# Patient Record
Sex: MALE | Race: WHITE | Employment: UNEMPLOYED | URBAN - METROPOLITAN AREA
[De-identification: names, ages, dates, MRNs, and addresses within clinical notes are randomized per-mention and may not be internally consistent; named-entity substitution may affect disease eponyms.]

---

## 2018-05-22 ENCOUNTER — HOSPICE ADMISSION (OUTPATIENT)
Dept: HOSPICE | Facility: HOSPICE | Age: 58
End: 2018-05-22
Payer: MEDICARE

## 2018-05-24 ENCOUNTER — HOME CARE VISIT (OUTPATIENT)
Dept: SCHEDULING | Facility: HOME HEALTH | Age: 58
End: 2018-05-24
Payer: MEDICARE

## 2018-05-24 PROCEDURE — 0651 HSPC ROUTINE HOME CARE

## 2018-05-24 PROCEDURE — G0299 HHS/HOSPICE OF RN EA 15 MIN: HCPCS

## 2018-05-24 PROCEDURE — HOSPICE MEDICATION HC HH HOSPICE MEDICATION

## 2018-05-24 PROCEDURE — 3336500001 HSPC ELECTION

## 2018-05-25 ENCOUNTER — HOME CARE VISIT (OUTPATIENT)
Dept: SCHEDULING | Facility: HOME HEALTH | Age: 58
End: 2018-05-25
Payer: MEDICARE

## 2018-05-25 VITALS
HEART RATE: 84 BPM | HEIGHT: 72 IN | WEIGHT: 160 LBS | BODY MASS INDEX: 21.67 KG/M2 | DIASTOLIC BLOOD PRESSURE: 64 MMHG | SYSTOLIC BLOOD PRESSURE: 90 MMHG | OXYGEN SATURATION: 97 % | RESPIRATION RATE: 20 BRPM

## 2018-05-25 PROCEDURE — G0299 HHS/HOSPICE OF RN EA 15 MIN: HCPCS

## 2018-05-25 PROCEDURE — G0155 HHCP-SVS OF CSW,EA 15 MIN: HCPCS

## 2018-05-25 PROCEDURE — 0651 HSPC ROUTINE HOME CARE

## 2018-05-26 ENCOUNTER — HOME CARE VISIT (OUTPATIENT)
Dept: HOSPICE | Facility: HOSPICE | Age: 58
End: 2018-05-26
Payer: MEDICARE

## 2018-05-26 PROCEDURE — 0651 HSPC ROUTINE HOME CARE

## 2018-05-27 PROCEDURE — 0651 HSPC ROUTINE HOME CARE

## 2018-05-28 ENCOUNTER — HOME CARE VISIT (OUTPATIENT)
Dept: SCHEDULING | Facility: HOME HEALTH | Age: 58
End: 2018-05-28
Payer: MEDICARE

## 2018-05-28 VITALS — HEART RATE: 96 BPM | DIASTOLIC BLOOD PRESSURE: 60 MMHG | RESPIRATION RATE: 24 BRPM | SYSTOLIC BLOOD PRESSURE: 96 MMHG

## 2018-05-28 PROCEDURE — 0651 HSPC ROUTINE HOME CARE

## 2018-05-29 ENCOUNTER — HOME CARE VISIT (OUTPATIENT)
Dept: SCHEDULING | Facility: HOME HEALTH | Age: 58
End: 2018-05-29
Payer: MEDICARE

## 2018-05-29 PROCEDURE — 99343 PR HOME VST NEW PATIENT MOD-HI SEVERITY 45 MINUTES: CPT | Performed by: INTERNAL MEDICINE

## 2018-05-29 PROCEDURE — 0651 HSPC ROUTINE HOME CARE

## 2018-05-29 PROCEDURE — G0299 HHS/HOSPICE OF RN EA 15 MIN: HCPCS

## 2018-05-30 ENCOUNTER — DOCUMENTATION ONLY (OUTPATIENT)
Dept: OTHER | Age: 58
End: 2018-05-30

## 2018-05-30 VITALS — SYSTOLIC BLOOD PRESSURE: 94 MMHG | DIASTOLIC BLOOD PRESSURE: 56 MMHG

## 2018-05-30 PROBLEM — F10.10 ETOH ABUSE: Status: ACTIVE | Noted: 2018-05-30

## 2018-05-30 PROBLEM — C78.7 METASTATIC CANCER TO LIVER (HCC): Status: ACTIVE | Noted: 2018-05-30

## 2018-05-30 PROBLEM — B19.20 HEPATITIS C: Status: ACTIVE | Noted: 2018-05-30

## 2018-05-30 PROCEDURE — 0651 HSPC ROUTINE HOME CARE

## 2018-05-31 ENCOUNTER — HOME CARE VISIT (OUTPATIENT)
Dept: SCHEDULING | Facility: HOME HEALTH | Age: 58
End: 2018-05-31
Payer: MEDICARE

## 2018-05-31 PROCEDURE — 0651 HSPC ROUTINE HOME CARE

## 2018-06-01 PROCEDURE — 0651 HSPC ROUTINE HOME CARE

## 2018-06-02 PROCEDURE — 0651 HSPC ROUTINE HOME CARE

## 2018-06-03 PROCEDURE — 0651 HSPC ROUTINE HOME CARE

## 2018-06-04 ENCOUNTER — HOSPITAL ENCOUNTER (OUTPATIENT)
Dept: ULTRASOUND IMAGING | Age: 58
Discharge: HOME | End: 2018-06-04
Attending: INTERNAL MEDICINE
Payer: OTHER MISCELLANEOUS

## 2018-06-04 VITALS — SYSTOLIC BLOOD PRESSURE: 92 MMHG | OXYGEN SATURATION: 93 % | DIASTOLIC BLOOD PRESSURE: 53 MMHG

## 2018-06-04 DIAGNOSIS — C22.0 HEPATOCELLULAR CARCINOMA (HCC): ICD-10-CM

## 2018-06-04 PROCEDURE — 74011000250 HC RX REV CODE- 250: Performed by: PHYSICIAN ASSISTANT

## 2018-06-04 PROCEDURE — 0651 HSPC ROUTINE HOME CARE

## 2018-06-04 PROCEDURE — C1729 CATH, DRAINAGE: HCPCS

## 2018-06-04 RX ORDER — LIDOCAINE HYDROCHLORIDE 10 MG/ML
10-200 INJECTION INFILTRATION; PERINEURAL ONCE
Status: COMPLETED | OUTPATIENT
Start: 2018-06-04 | End: 2018-06-04

## 2018-06-04 RX ADMIN — LIDOCAINE HYDROCHLORIDE 4 ML: 10 INJECTION, SOLUTION INFILTRATION; PERINEURAL at 13:32

## 2018-06-04 RX ADMIN — SODIUM BICARBONATE 2 ML: 0.2 INJECTION, SOLUTION INTRAVENOUS at 13:32

## 2018-06-04 NOTE — DISCHARGE INSTRUCTIONS
[address]  Department of Interventional Radiology  [facility]  [phone] Office  [phone] Fax    PARACENTESIS DISCHARGE INSTRUCTIONS    General Information:  During this procedure, the doctor will insert a needle into the abdomen to drain fluid. After the procedure, you will be able to take a deep breath much easier. The site of the puncture may ooze the first day. This will decrease and eventually stop. Paracentesis (draining fluid from the abdomen) sometimes makes patients hypotensive (low blood pressure). Your doctor may order for you to receive fluids or albumin (a volume booster) during the procedure through an IV site. Home Care Instructions:  Keep the puncture site clean and dry. No tub baths or swimming until puncture site heals. Showering is acceptable. Resume your normal diet, and resume your normal activity slowly and as you tolerate. If you are short of breath, rest. If shortness of breath does not ease, please call your ordering doctor. Fluid can re-accumulate in the chest and/or in the abdomen. If this should occur, your doctor needs to know as you may need to have the procedure done again. Call If:     You should call your Physician and/or the Radiology Nurse if you notice any signs of infection, like pus draining, or if it is swollen or reddened. Also call if you have a fever, or if you are bleeding from the puncture site more than a small amount on the dressing. Call if the puncture site keeps draining fluid. Some oozing is to be expected, but should slow and then stop. Call if you feel like you have pressure in your abdomen. SEEK IMMEDIATE CARE OR CALL 911 IF YOU SUDDENLY HAVE TROUBLE BREATHING, OR IF YOUR LIPS TURN BLUE, OR IF YOU NOTICE BLOOD IN YOUR SPUTUM. Follow-Up Instructions: Please see your ordering doctor as he/she has requested. To Reach Us:     If you have any questions about your procedure, please call the Interventional Radiology department at [phone]. After business hours (5pm) and weekends, call the answering service at [phone] and ask for the Radiologist on call to be paged. Interventional Radiology General Nurse Discharge    * Please give a list of your current medications to your Primary Care Provider. * Please update this list whenever your medications are discontinued, doses are     changed, or new medications (including over-the-counter products) are added. * Please carry medication information at all times in case of emergency situations. These are general instructions for a healthy lifestyle:    No smoking/ No tobacco products/ Avoid exposure to second hand smoke  Surgeon General's Warning:  Quitting smoking now greatly reduces serious risk to your health. Obesity, smoking, and sedentary lifestyle greatly increases your risk for illness  A healthy diet, regular physical exercise & weight monitoring are important for maintaining a healthy lifestyle    You may be retaining fluid if you have a history of heart failure or if you experience any of the following symptoms:  Weight gain of 3 pounds or more overnight or 5 pounds in a week, increased swelling in our hands or feet or shortness of breath while lying flat in bed. Please call your doctor as soon as you notice any of these symptoms; do not wait until your next office visit. Recognize signs and symptoms of STROKE:  F-face looks uneven    A-arms unable to move or move unevenly    S-speech slurred or non-existent    T-time-call 911 as soon as signs and symptoms begin-DO NOT go       Back to bed or wait to see if you get better-TIME IS BRAIN.             Date: 6/4/2018  Discharging Nurse: Alonzo Jose

## 2018-06-04 NOTE — PROGRESS NOTES
Interventional Radiology Post Paracentesis/Thoracentesis Note    6/4/2018    Procedure(s): Ultrasound guided Therapeutic Paracentesis Performed with 8 [place] catheter total volume 4,000 ml. Preliminary Findings: moderate cloudy and tan. Complications: None    Pt requesting sutures for closure rather than dermabond glue. Plan:  Observation, check labs if drawn.           Chest X-Ray:  no    Full dictated report to follow    Signed By: Vince Guaman

## 2018-06-04 NOTE — IP AVS SNAPSHOT
Summary of Care Report The Summary of Care report has been created to help improve care coordination. Users with access to [employer] or [employer] [facility] (Web-based application) may access additional patient information including the Discharge Summary. If you are not currently a [address] user and need more information, please call the number listed below in the [place] [address] section and ask to be connected with Medical Records. Facility Information Name Address Phone [phone] [address] [address] [phone] Patient Information Patient Name Sex  Brian Dials ([id number]) Male 1960 Discharge Information Admitting Provider Service Area Unit  
 (none) [facility] Radiology  / [phone] Discharge Provider Discharge Date/Time Discharge Disposition Destination (none) (none) (none) (none) Patient Language Language ENGLISH [13] Hospital Problems as of 2018  Reviewed: 2018  9:49 AM by Pacheco Callahan NP None Non-Hospital Problems as of 2018  Reviewed: 2018  9:49 AM by Pacheco Callahan NP Class Noted - Resolved Last Modified Active Problems Osteoarthritis of right knee  2010 - Present 2010 Entered by Zoila Mcbride MD  
  Total knee replacement status  2010 - Present 2010 Entered by Zoila Mcbride MD  
  Localized osteoarthrosis not specified whether primary or secondary, pelvic region and thigh  9/15/2015 - Present 9/15/2015 by Magalys Barr Entered by Magalys Barr S/P total hip arthroplasty  9/15/2015 - Present 9/15/2015 by Rolly Kelley MD  
  Entered by Rolly Kelley MD  
  Metastatic cancer to liver [facility])  2018 - Present 2018 by Pacheco Callahan NP   Entered by Pacheco Callahan NP  
 Hepatitis C  5/30/2018 - Present 5/30/2018 by Tony Garcia NP Entered by Tony Garcia NP  
  ETOH abuse  5/30/2018 - Present 5/30/2018 by Tony Garcia NP Entered by Tony Garcia NP You are allergic to the following Allergen Reactions Oxycontin (Oxycodone) Nausea and Vomiting Current Discharge Medication List  
  
ASK your doctor about these medications Dose & Instructions Dispensing Information Comments  
 furosemide 80 mg tablet Commonly known as:  LASIX Dose:  160 mg Take 160 mg by mouth daily. Refills:  0 HYDROmorphone 2 mg tablet Commonly known as:  DILAUDID Dose:  2 mg Take 2 mg by mouth every six (6) hours as needed for Pain. Refills:  0  
   
 lactulose 10 gram/15 mL solution Commonly known as:  [place] Dose:  10 g Take 10 g by mouth daily as needed (for constipation). Refills:  0  
   
 ondansetron hcl 8 mg tablet Commonly known as:  Norina [place] Dose:  8 mg Take 8 mg by mouth every eight (8) hours as needed for Nausea. Refills:  0  
   
 spironolactone 100 mg tablet Commonly known as:  ALDACTONE Dose:  400 mg Take 400 mg by mouth daily. takes in the afternoon Refills:  0  
   
 traZODone 100 mg tablet Commonly known as:  Dina Jes Dose:  100 mg Take 100 mg by mouth nightly. for sleep Refills:  0 Current Immunizations Name Date  
 TB Skin Test (PPD) Intradermal 9/15/2015 Surgery Information ID Date/Time Status Primary Surgeon All Procedures Location [id number] 6/4/2018 Unposted   [facility] ANGIO IR OR-DO NOT SCHEDULE Follow-up Information None Discharge Instructions [place] [address] Department of Interventional Radiology [facility] 
[phone] Office 
[phone] Fax PARACENTESIS DISCHARGE INSTRUCTIONS General Information: 
During this procedure, the doctor will insert a needle into the abdomen to drain fluid. After the procedure, you will be able to take a deep breath much easier. The site of the puncture may ooze the first day. This will decrease and eventually stop. Paracentesis (draining fluid from the abdomen) sometimes makes patients hypotensive (low blood pressure). Your doctor may order for you to receive fluids or albumin (a volume booster) during the procedure through an IV site. Home Care Instructions: 
Keep the puncture site clean and dry. No tub baths or swimming until puncture site heals. Showering is acceptable. Resume your normal diet, and resume your normal activity slowly and as you tolerate. If you are short of breath, rest. If shortness of breath does not ease, please call your ordering doctor. Fluid can re-accumulate in the chest and/or in the abdomen. If this should occur, your doctor needs to know as you may need to have the procedure done again. Call If: 
   You should call your Physician and/or the Radiology Nurse if you notice any signs of infection, like pus draining, or if it is swollen or reddened. Also call if you have a fever, or if you are bleeding from the puncture site more than a small amount on the dressing. Call if the puncture site keeps draining fluid. Some oozing is to be expected, but should slow and then stop. Call if you feel like you have pressure in your abdomen. SEEK IMMEDIATE CARE OR CALL 911 IF YOU SUDDENLY HAVE TROUBLE BREATHING, OR IF YOUR LIPS TURN BLUE, OR IF YOU NOTICE BLOOD IN YOUR SPUTUM. Follow-Up Instructions: Please see your ordering doctor as he/she has requested. To Reach Us: If you have any questions about your procedure, please call the Interventional Radiology department at [phone]. After business hours (5pm) and weekends, call the answering service at [phone] and ask for the Radiologist on call to be paged. Interventional Radiology General Nurse Discharge * Please give a list of your current medications to your Primary Care Provider. * Please update this list whenever your medications are discontinued, doses are 
   changed, or new medications (including over-the-counter products) are added. * Please carry medication information at all times in case of emergency situations. These are general instructions for a healthy lifestyle: No smoking/ No tobacco products/ Avoid exposure to second hand smoke Surgeon General's Warning:  Quitting smoking now greatly reduces serious risk to your health. Obesity, smoking, and sedentary lifestyle greatly increases your risk for illness A healthy diet, regular physical exercise & weight monitoring are important for maintaining a healthy lifestyle You may be retaining fluid if you have a history of heart failure or if you experience any of the following symptoms:  Weight gain of 3 pounds or more overnight or 5 pounds in a week, increased swelling in our hands or feet or shortness of breath while lying flat in bed. Please call your doctor as soon as you notice any of these symptoms; do not wait until your next office visit. Recognize signs and symptoms of STROKE: 
F-face looks uneven A-arms unable to move or move unevenly S-speech slurred or non-existent T-time-call 911 as soon as signs and symptoms begin-DO NOT go Back to bed or wait to see if you get better-TIME IS BRAIN. Date: 6/4/2018 Discharging Nurse: Regis Vaughn Chart Review Routing History No Routing History on File

## 2018-06-04 NOTE — IP AVS SNAPSHOT
[address] 
[phone] Patient: Burton Draper MRN: [id number] EAW:18/01/0150 About your hospitalization You were admitted on:  June 4, 2018 You last received care in the:  [facility] You were discharged on:  June 4, 2018 Why you were hospitalized Your primary diagnosis was:  Not on File Follow-up Information None Your Scheduled Appointments Tuesday June 05, 2018 To Be Determined SN HOSPICE VISIT with Savannah Van RN  
[facility] ([facility] ([facility]) Friday June 08, 2018 To Be Determined MSW HOSPICE VISIT with Buckner Olszewski, LMSW [facility]) Tuesday June 12, 2018 To Be Determined SN HOSPICE VISIT with LEONARDO Betts [facility] Tuesday June 19, 2018 To Be Determined SN HOSPICE VISIT with LEONARDO Betts [facility] Tuesday June 26, 2018 To Be Determined SN HOSPICE VISIT with LEONARDO Betts [facility] ([facility]) Monday July 02, 2018 To Be Determined  with Vinnie Bermudez [facility] ([facility])  Tuesday July 03, 2018 To Be Determined SN HOSPICE VISIT with LEONARDO Knight [facility] (1 [facility]) Tuesday July 10, 2018 To Be Determined SN HOSPICE VISIT with LEONARDO Knight [facility] ([facility]) Thursday July 12, 2018 To Be Determined MSW HOSPICE VISIT with Myra Oliveira LMSW [facility] Tuesday July 17, 2018 To Be Determined SN HOSPICE VISIT with LEONARDO Knight [facility] ([facility]) Tuesday July 24, 2018 To Be Determined SN HOSPICE VISIT with LEONARDO Knight [facility] Monday July 30, 2018 [facility] Discharge Orders None A check gisela indicates which time of day the medication should be taken. My Medications ASK your doctor about these medications Instructions Each Dose to Equal  
 Morning Noon Evening Bedtime  
 furosemide 80 mg tablet Commonly known as:  LASIX Your last dose was: Your next dose is: Take 160 mg by mouth daily. 160 mg HYDROmorphone 2 mg tablet Commonly known as:  DILAUDID Your last dose was:     
   
Your next dose is: Take 2 mg by mouth every six (6) hours as needed for Pain. 2 mg  
    
   
   
   
  
 lactulose 10 gram/15 mL solution Commonly known as:  Maryla Mood Your last dose was: Your next dose is: Take 10 g by mouth daily as needed (for constipation). 10 g  
    
   
   
   
  
 ondansetron hcl 8 mg tablet Commonly known as:  Yehuda Mighty Your last dose was: Your next dose is: Take 8 mg by mouth every eight (8) hours as needed for Nausea. 8 mg  
    
   
   
   
  
 spironolactone 100 mg tablet Commonly known as:  ALDACTONE Your last dose was: Your next dose is: Take 400 mg by mouth daily. takes in the afternoon 400 mg  
    
   
   
   
  
 traZODone 100 mg tablet Commonly known as:  Eddi Ort Your last dose was: Your next dose is: Take 100 mg by mouth nightly. for sleep 100 mg Opioid Education Prescription Opioids: What You Need to Know: 
 
Prescription opioids can be used to help relieve moderate-to-severe pain and are often prescribed following a surgery or injury, or for certain health conditions. These medications can be an important part of treatment but also come with serious risks. Opioids are strong pain medicines. Examples include hydrocodone, oxycodone, fentanyl, and morphine. Heroin is an example of an illegal opioid. It is important to work with your health care provider to make sure you are getting the safest, most effective care. WHAT ARE THE RISKS AND SIDE EFFECTS OF OPIOID USE? Prescription opioids carry serious risks of addiction and overdose, especially with prolonged use. An opioid overdose, often marked by slow breathing, can cause sudden death. The use of prescription opioids can have a number of side effects as well, even when taken as directed.  
  
· Tolerance-meaning you might need to take more of a medication for the same pain relief · Physical dependence-meaning you have symptoms of withdrawal when the medication is stopped. Withdrawal symptoms can include nausea, sweating, chills, diarrhea, stomach cramps, and muscle aches. Withdrawal can last up to several weeks, depending on which drug you took and how long you took it. · Increased sensitivity to pain · Constipation · Nausea, vomiting, and dry mouth · Sleepiness and dizziness · Confusion · Depression · Low levels of testosterone that can result in lower sex drive, energy, and strength · Itching and sweating RISKS ARE GREATER WITH:      
· History of drug misuse, substance use disorder, or overdose · Mental health conditions (such as depression or anxiety) · Sleep apnea · Older age (72 years or older) · Pregnancy Avoid alcohol while taking prescription opioids. Also, unless specifically advised by your health care provider, medications to avoid include: · Benzodiazepines (such as Xanax or Valium) · Muscle relaxants (such as Soma or Flexeril) · Hypnotics (such as Ambien or Lunesta) · Other prescription opioids KNOW YOUR OPTIONS Talk to your health care provider about ways to manage your pain that don't involve prescription opioids. Some of these options may actually work better and have fewer risks and side effects. Options may include: 
· Pain relievers such as acetaminophen, ibuprofen, and naproxen · Some medications that are also used for depression or seizures · Physical therapy and exercise · Counseling to help patients learn how to cope better with triggers of pain and stress. · Application of heat or cold compress · Massage therapy · Relaxation techniques Be Informed Make sure you know the name of your medication, how much and how often to take it, and its potential risks & side effects. IF YOU ARE PRESCRIBED OPIOIDS FOR PAIN: 
· Never take opioids in greater amounts or more often than prescribed.   Remember the goal is not to be pain-free but to manage your pain at a tolerable level. · Follow up with your primary care provider to: · Work together to create a plan on how to manage your pain. · Talk about ways to help manage your pain that don't involve prescription opioids. · Talk about any and all concerns and side effects. · Help prevent misuse and abuse. · Never sell or share prescription opioids · Help prevent misuse and abuse. · Store prescription opioids in a secure place and out of reach of others (this may include visitors, children, friends, and family). · Safely dispose of unused/unwanted prescription opioids: Find your community drug take-back program or your pharmacy mail-back program, or flush them down the toilet, following guidance from the Food and Drug Administration (www.fda.gov/Drugs/ResourcesForYou). · Visit www.cdc.gov/drugoverdose to learn about the risks of opioid abuse and overdose. · If you believe you may be struggling with addiction, tell your health care provider and ask for guidance or call [address] [place] [address] at [phone]. Discharge Instructions [address] Department of Interventional Radiology [facility] 
[phone] Office 
[phone] Fax PARACENTESIS DISCHARGE INSTRUCTIONS General Information: 
During this procedure, the doctor will insert a needle into the abdomen to drain fluid. After the procedure, you will be able to take a deep breath much easier. The site of the puncture may ooze the first day. This will decrease and eventually stop. Paracentesis (draining fluid from the abdomen) sometimes makes patients hypotensive (low blood pressure). Your doctor may order for you to receive fluids or albumin (a volume booster) during the procedure through an IV site.   
 
Home Care Instructions: 
Keep the puncture site clean and dry. No tub baths or swimming until puncture site heals. Showering is acceptable. Resume your normal diet, and resume your normal activity slowly and as you tolerate. If you are short of breath, rest. If shortness of breath does not ease, please call your ordering doctor. Fluid can re-accumulate in the chest and/or in the abdomen. If this should occur, your doctor needs to know as you may need to have the procedure done again. Call If: 
   You should call your Physician and/or the Radiology Nurse if you notice any signs of infection, like pus draining, or if it is swollen or reddened. Also call if you have a fever, or if you are bleeding from the puncture site more than a small amount on the dressing. Call if the puncture site keeps draining fluid. Some oozing is to be expected, but should slow and then stop. Call if you feel like you have pressure in your abdomen. SEEK IMMEDIATE CARE OR CALL 911 IF YOU SUDDENLY HAVE TROUBLE BREATHING, OR IF YOUR LIPS TURN BLUE, OR IF YOU NOTICE BLOOD IN YOUR SPUTUM. Follow-Up Instructions: Please see your ordering doctor as he/she has requested. To Reach Us: If you have any questions about your procedure, please call the Interventional Radiology department at [phone]. After business hours (5pm) and weekends, call the answering service at [phone] and ask for the Radiologist on call to be paged. Interventional Radiology General Nurse Discharge * Please give a list of your current medications to your Primary Care Provider. * Please update this list whenever your medications are discontinued, doses are 
   changed, or new medications (including over-the-counter products) are added. * Please carry medication information at all times in case of emergency situations. These are general instructions for a healthy lifestyle: No smoking/ No tobacco products/ Avoid exposure to second hand smoke Surgeon General's Warning:  Quitting smoking now greatly reduces serious risk to your health. Obesity, smoking, and sedentary lifestyle greatly increases your risk for illness A healthy diet, regular physical exercise & weight monitoring are important for maintaining a healthy lifestyle You may be retaining fluid if you have a history of heart failure or if you experience any of the following symptoms:  Weight gain of 3 pounds or more overnight or 5 pounds in a week, increased swelling in our hands or feet or shortness of breath while lying flat in bed. Please call your doctor as soon as you notice any of these symptoms; do not wait until your next office visit. Recognize signs and symptoms of STROKE: 
F-face looks uneven A-arms unable to move or move unevenly S-speech slurred or non-existent T-time-call 911 as soon as signs and symptoms begin-DO NOT go Back to bed or wait to see if you get better-TIME IS BRAIN. Date: 6/4/2018 Discharging Nurse: Meaghan Chacon Introducing [facility]! Santos Noble introduces [employer] patient portal. Now you can access parts of your medical record, email your doctor's office, and request medication refills online. 1. In your internet browser, go to [email or web address] 2. Click on the First Time User? Click Here link in the Sign In box. You will see the New Member Sign Up page. 3. Enter your [employer] Access Code exactly as it appears below. You will not need to use this code after youve completed the sign-up process. If you do not sign up before the expiration date, you must request a new code. · [employer] Access Code: [email or web address] Expires: 8/20/2018  3:03 PM 
 
4. Enter the last four digits of your Social Security Number (xxxx) and Date of Birth (mm/dd/yyyy) as indicated and click Submit. You will be taken to the next sign-up page. 5. Create a [employer] ID.  This will be your [employer] login ID and cannot be changed, so think of one that is secure and easy to remember. 6. Create a [employer] password. You can change your password at any time. 7. Enter your Password Reset Question and Answer. This can be used at a later time if you forget your password. 8. Enter your e-mail address. You will receive e-mail notification when new information is available in [address] 9. Click Sign Up. You can now view and download portions of your medical record. 10. Click the Download Summary menu link to download a portable copy of your medical information. If you have questions, please visit the Frequently Asked Questions section of the [employer] website. Remember, [employer] is NOT to be used for urgent needs. For medical emergencies, dial 911. Now available from your iPhone and Android! Introducing Preston Nogueira As a [facility] patient, I wanted to make you aware of our electronic visit tool called Preston Nogueira. Regan QuintanillaYelll[employer] [facility] Munch [email or web address] allows you to connect within minutes with a medical provider 24 hours a day, seven days a week via a mobile device or tablet or logging into a secure website from your computer. You can access Preston Nogueira from anywhere in the [place]. A virtual visit might be right for you when you have a simple condition and feel like you just dont want to get out of bed, or cant get away from work for an appointment, when your regular [facility] provider is not available (evenings, weekends or holidays), or when youre out of town and need minor care. Electronic visits cost only $49 and if the [employer] [facility] [employer]unch [email or web address] provider determines a prescription is needed to treat your condition, one can be electronically transmitted to a nearby pharmacy*. Please take a moment to enroll today if you have not already done so. The enrollment process is free and takes just a few minutes. To enroll, please download the [employer] to your tablet or phone, or visit [email or web address] to enroll on your computer. And, as an [address] [place] [address] patient with a [employer] account, the results of your visits will be scanned into your electronic medical record and your primary care provider will be able to view the scanned results. We urge you to continue to see your regular [employer] provider for your ongoing medical care. And while your primary care provider may not be the one available when you seek a Preston Richardsfin virtual visit, the peace of mind you get from getting a real diagnosis real time can be priceless. For more information on Novel [employer]den[employer], view our Frequently Asked Questions (FAQs) at [email or web address]. Sincerely, 
 
Ashleigh Troy MD 
Chief Medical Officer [address] *:  certain medications cannot be prescribed via [employer] Unresulted Labs-Please follow up with your PCP about these lab tests Order Current Status US GUIDE PARACENTESIS In process Providers Seen During Your Hospitalization Provider Specialty Primary office phone Angela Ballesteros MD Geriatric Medicine [phone] Your Primary Care Physician (PCP) Primary Care Physician Office Phone Office Fax NONE ** None ** ** None ** You are allergic to the following Allergen Reactions Oxycontin (Oxycodone) Nausea and Vomiting Recent Documentation Smoking Status Current Every Day Smoker Emergency Contacts Name Discharge Info Relation Home Work Mobile Jen Rodriguez  Girlfriend [18] [phone] Patient Belongings The following personal items are in your possession at time of discharge: 
     Visual Aid: Glasses Please provide this summary of care documentation to your next provider. Signatures-by signing, you are acknowledging that this After Visit Summary has been reviewed with you and you have received a copy.   
  
 
  
    
    
 Patient Signature:  ____________________________________________________________ Date:  ____________________________________________________________  
  
Guinevere Coup Provider Signature:  ____________________________________________________________ Date:  ____________________________________________________________

## 2018-06-05 PROCEDURE — 0651 HSPC ROUTINE HOME CARE

## 2018-06-06 PROCEDURE — 0651 HSPC ROUTINE HOME CARE

## 2018-06-07 ENCOUNTER — HOME CARE VISIT (OUTPATIENT)
Dept: SCHEDULING | Facility: HOME HEALTH | Age: 58
End: 2018-06-07
Payer: MEDICARE

## 2018-06-07 PROCEDURE — G0299 HHS/HOSPICE OF RN EA 15 MIN: HCPCS

## 2018-06-07 PROCEDURE — 0651 HSPC ROUTINE HOME CARE

## 2018-06-07 PROCEDURE — HOSPICE MEDICATION HC HH HOSPICE MEDICATION

## 2018-06-08 VITALS
DIASTOLIC BLOOD PRESSURE: 62 MMHG | HEART RATE: 84 BPM | TEMPERATURE: 96.9 F | RESPIRATION RATE: 22 BRPM | SYSTOLIC BLOOD PRESSURE: 100 MMHG

## 2018-06-08 PROCEDURE — 0651 HSPC ROUTINE HOME CARE

## 2018-06-09 PROCEDURE — 0651 HSPC ROUTINE HOME CARE

## 2018-06-10 PROCEDURE — 0651 HSPC ROUTINE HOME CARE

## 2018-06-11 ENCOUNTER — HOSPITAL ENCOUNTER (OUTPATIENT)
Dept: ULTRASOUND IMAGING | Age: 58
Discharge: HOME | End: 2018-06-11
Attending: INTERNAL MEDICINE
Payer: OTHER MISCELLANEOUS

## 2018-06-11 VITALS
TEMPERATURE: 98 F | HEART RATE: 88 BPM | SYSTOLIC BLOOD PRESSURE: 97 MMHG | DIASTOLIC BLOOD PRESSURE: 56 MMHG | BODY MASS INDEX: 21.4 KG/M2 | OXYGEN SATURATION: 93 % | RESPIRATION RATE: 16 BRPM | HEIGHT: 72 IN | WEIGHT: 158 LBS

## 2018-06-11 DIAGNOSIS — R18.8 ASCITES: ICD-10-CM

## 2018-06-11 PROCEDURE — C1729 CATH, DRAINAGE: HCPCS

## 2018-06-11 PROCEDURE — 74011000250 HC RX REV CODE- 250: Performed by: RADIOLOGY

## 2018-06-11 PROCEDURE — 0651 HSPC ROUTINE HOME CARE

## 2018-06-11 RX ORDER — LIDOCAINE HYDROCHLORIDE 10 MG/ML
20-200 INJECTION INFILTRATION; PERINEURAL ONCE
Status: COMPLETED | OUTPATIENT
Start: 2018-06-11 | End: 2018-06-11

## 2018-06-11 RX ADMIN — LIDOCAINE HYDROCHLORIDE 6 ML: 10 INJECTION, SOLUTION INFILTRATION; PERINEURAL at 16:42

## 2018-06-11 RX ADMIN — SODIUM BICARBONATE 2 ML: 0.2 INJECTION, SOLUTION INTRAVENOUS at 16:42

## 2018-06-11 NOTE — PROCEDURES
Interventional Radiology Brief Procedure Note    Patient: Janak White MRN: [id number]  SSN: [id number]    YOB: 1960  Age: 61 y.o.   Sex: male      Date of Procedure: 6/11/2018    Pre-Procedure Diagnosis:ascites    Post-Procedure Diagnosis: SAME    Procedure(s): Paracentesis    Brief Description of Procedure: 6 liters chylous ascites; RLQ    Performed By: Carmela Fernando MD     Assistants: None    Anesthesia: Lidocaine    Estimated Blood Loss: None    Specimens: None    Implants: None    Findings: OK     Complications: None    Recommendations: home in 30 min     Follow Up: prn    Signed By: Carmela Fernando MD     June 11, 2018

## 2018-06-11 NOTE — PROGRESS NOTES
VSS during 30 minute observation. PT discharged ambulatory to waiting room; he declined discharge instructions.

## 2018-06-12 ENCOUNTER — HOME CARE VISIT (OUTPATIENT)
Dept: SCHEDULING | Facility: HOME HEALTH | Age: 58
End: 2018-06-12
Payer: MEDICARE

## 2018-06-12 PROCEDURE — 0651 HSPC ROUTINE HOME CARE

## 2018-06-13 PROCEDURE — 0651 HSPC ROUTINE HOME CARE

## 2018-06-14 ENCOUNTER — HOME CARE VISIT (OUTPATIENT)
Dept: SCHEDULING | Facility: HOME HEALTH | Age: 58
End: 2018-06-14
Payer: MEDICARE

## 2018-06-14 PROCEDURE — 0651 HSPC ROUTINE HOME CARE

## 2018-06-14 PROCEDURE — HOSPICE MEDICATION HC HH HOSPICE MEDICATION

## 2018-06-14 PROCEDURE — G0299 HHS/HOSPICE OF RN EA 15 MIN: HCPCS

## 2018-06-15 VITALS — RESPIRATION RATE: 20 BRPM | DIASTOLIC BLOOD PRESSURE: 56 MMHG | HEART RATE: 108 BPM | SYSTOLIC BLOOD PRESSURE: 90 MMHG

## 2018-06-15 PROCEDURE — 0651 HSPC ROUTINE HOME CARE

## 2018-06-16 PROCEDURE — 0651 HSPC ROUTINE HOME CARE

## 2018-06-17 PROCEDURE — 0651 HSPC ROUTINE HOME CARE

## 2018-06-18 ENCOUNTER — HOSPITAL ENCOUNTER (OUTPATIENT)
Dept: ULTRASOUND IMAGING | Age: 58
Discharge: HOME | End: 2018-06-18
Attending: INTERNAL MEDICINE
Payer: OTHER MISCELLANEOUS

## 2018-06-18 VITALS
DIASTOLIC BLOOD PRESSURE: 46 MMHG | SYSTOLIC BLOOD PRESSURE: 85 MMHG | OXYGEN SATURATION: 96 % | RESPIRATION RATE: 16 BRPM

## 2018-06-18 DIAGNOSIS — R18.8 ASCITES: ICD-10-CM

## 2018-06-18 PROCEDURE — 77030037400 US GUIDE PARACENTESIS

## 2018-06-18 PROCEDURE — 0651 HSPC ROUTINE HOME CARE

## 2018-06-18 PROCEDURE — 74011000250 HC RX REV CODE- 250: Performed by: PHYSICIAN ASSISTANT

## 2018-06-18 RX ORDER — LIDOCAINE HYDROCHLORIDE 10 MG/ML
20-200 INJECTION INFILTRATION; PERINEURAL ONCE
Status: COMPLETED | OUTPATIENT
Start: 2018-06-18 | End: 2018-06-18

## 2018-06-18 RX ADMIN — LIDOCAINE HYDROCHLORIDE 2 ML: 10 INJECTION, SOLUTION INFILTRATION; PERINEURAL at 12:01

## 2018-06-18 RX ADMIN — SODIUM BICARBONATE 2 ML: 0.2 INJECTION, SOLUTION INTRAVENOUS at 12:01

## 2018-06-18 NOTE — PROGRESS NOTES
PT hypotensive with no c/o dizziness, says \"it gets low sometimes\". Taylor Hu (PA) assessed pt, ok to go home.

## 2018-06-18 NOTE — PROGRESS NOTES
Interventional Radiology Post Paracentesis/Thoracentesis Note    6/18/2018    Procedure(s): Ultrasound guided Therapeutic Paracentesis Performed with 8 Swedish catheter total volume 6,000 ml. Preliminary Findings: moderate cloudy and white. Complications: None    Plan:  Observation, check labs if drawn.           Chest X-Ray:  no    Full dictated report to follow    Signed By: Fei Monte

## 2018-06-19 PROCEDURE — 0651 HSPC ROUTINE HOME CARE

## 2018-06-20 PROCEDURE — 0651 HSPC ROUTINE HOME CARE

## 2018-06-21 ENCOUNTER — HOME CARE VISIT (OUTPATIENT)
Dept: HOSPICE | Facility: HOSPICE | Age: 58
End: 2018-06-21
Payer: MEDICARE

## 2018-06-21 ENCOUNTER — HOME CARE VISIT (OUTPATIENT)
Dept: SCHEDULING | Facility: HOME HEALTH | Age: 58
End: 2018-06-21
Payer: MEDICARE

## 2018-06-21 PROCEDURE — G0155 HHCP-SVS OF CSW,EA 15 MIN: HCPCS

## 2018-06-21 PROCEDURE — G0299 HHS/HOSPICE OF RN EA 15 MIN: HCPCS

## 2018-06-21 PROCEDURE — 0651 HSPC ROUTINE HOME CARE

## 2018-06-22 PROCEDURE — HOSPICE MEDICATION HC HH HOSPICE MEDICATION

## 2018-06-22 PROCEDURE — 0651 HSPC ROUTINE HOME CARE

## 2018-06-23 PROCEDURE — 0651 HSPC ROUTINE HOME CARE

## 2018-06-24 PROCEDURE — 0651 HSPC ROUTINE HOME CARE

## 2018-06-25 ENCOUNTER — HOSPITAL ENCOUNTER (OUTPATIENT)
Dept: ULTRASOUND IMAGING | Age: 58
Discharge: HOME | End: 2018-06-25
Attending: INTERNAL MEDICINE
Payer: MEDICARE

## 2018-06-25 VITALS
TEMPERATURE: 97.7 F | HEIGHT: 72 IN | WEIGHT: 145 LBS | HEART RATE: 86 BPM | SYSTOLIC BLOOD PRESSURE: 86 MMHG | RESPIRATION RATE: 16 BRPM | OXYGEN SATURATION: 96 % | BODY MASS INDEX: 19.64 KG/M2 | DIASTOLIC BLOOD PRESSURE: 51 MMHG

## 2018-06-25 VITALS — HEART RATE: 96 BPM | RESPIRATION RATE: 20 BRPM | DIASTOLIC BLOOD PRESSURE: 56 MMHG | SYSTOLIC BLOOD PRESSURE: 92 MMHG

## 2018-06-25 DIAGNOSIS — R18.8 ASCITES: ICD-10-CM

## 2018-06-25 PROCEDURE — 74011000250 HC RX REV CODE- 250: Performed by: PHYSICIAN ASSISTANT

## 2018-06-25 PROCEDURE — 0651 HSPC ROUTINE HOME CARE

## 2018-06-25 PROCEDURE — 77030037400 US GUIDE PARACENTESIS

## 2018-06-25 RX ORDER — LIDOCAINE HYDROCHLORIDE 10 MG/ML
1-20 INJECTION INFILTRATION; PERINEURAL ONCE
Status: COMPLETED | OUTPATIENT
Start: 2018-06-25 | End: 2018-06-25

## 2018-06-25 RX ADMIN — SODIUM BICARBONATE 2 ML: 0.2 INJECTION, SOLUTION INTRAVENOUS at 11:43

## 2018-06-25 RX ADMIN — LIDOCAINE HYDROCHLORIDE 8 ML: 10 INJECTION, SOLUTION INFILTRATION; PERINEURAL at 11:43

## 2018-06-25 NOTE — DISCHARGE INSTRUCTIONS
[address]  Department of Interventional Radiology  [address] [place] [address] [address] [facility]  [phone] Office  [phone] Fax    PARACENTESIS DISCHARGE INSTRUCTIONS    General Information:  During this procedure, the doctor will insert a needle into the abdomen to drain fluid. After the procedure, you will be able to take a deep breath much easier. The site of the puncture may ooze the first day. This will decrease and eventually stop. Paracentesis (draining fluid from the abdomen) sometimes makes patients hypotensive (low blood pressure). Your doctor may order for you to receive fluids or albumin (a volume booster) during the procedure through an IV site. Home Care Instructions:  Keep the puncture site clean and dry. No tub baths or swimming until puncture site heals. Showering is acceptable. Resume your normal diet, and resume your normal activity slowly and as you tolerate. If you are short of breath, rest. If shortness of breath does not ease, please call your ordering doctor. Fluid can re-accumulate in the chest and/or in the abdomen. If this should occur, your doctor needs to know as you may need to have the procedure done again. Call If:     You should call your Physician and/or the Radiology Nurse if you notice any signs of infection, like pus draining, or if it is swollen or reddened. Also call if you have a fever, or if you are bleeding from the puncture site more than a small amount on the dressing. Call if the puncture site keeps draining fluid. Some oozing is to be expected, but should slow and then stop. Call if you feel like you have pressure in your abdomen. SEEK IMMEDIATE CARE OR CALL 911 IF YOU SUDDENLY HAVE TROUBLE BREATHING, OR IF YOUR LIPS TURN BLUE, OR IF YOU NOTICE BLOOD IN YOUR SPUTUM. Follow-Up Instructions: Please see your ordering doctor as he/she has requested. To Reach Us:       If you have any questions about your procedure, please call the Interventional Radiology department at [phone]. After business hours (5pm) and weekends, call the answering service at [phone] and ask for the Radiologist on call to be paged. Interventional Radiology General Nurse Discharge    After general anesthesia or intravenous sedation, for 24 hours or while taking prescription Narcotics:  · Limit your activities  · Do not drive and operate hazardous machinery  · Do not make important personal or business decisions  · Do  not drink alcoholic beverages  · If you have not urinated within 8 hours after discharge, please contact your surgeon on call. * Please give a list of your current medications to your Primary Care Provider. * Please update this list whenever your medications are discontinued, doses are     changed, or new medications (including over-the-counter products) are added. * Please carry medication information at all times in case of emergency situations. These are general instructions for a healthy lifestyle:    No smoking/ No tobacco products/ Avoid exposure to second hand smoke  Surgeon General's Warning:  Quitting smoking now greatly reduces serious risk to your health. Obesity, smoking, and sedentary lifestyle greatly increases your risk for illness  A healthy diet, regular physical exercise & weight monitoring are important for maintaining a healthy lifestyle    You may be retaining fluid if you have a history of heart failure or if you experience any of the following symptoms:  Weight gain of 3 pounds or more overnight or 5 pounds in a week, increased swelling in our hands or feet or shortness of breath while lying flat in bed. Please call your doctor as soon as you notice any of these symptoms; do not wait until your next office visit.     Recognize signs and symptoms of STROKE:  F-face looks uneven    A-arms unable to move or move unevenly    S-speech slurred or non-existent    T-time-call 911 as soon as signs and symptoms begin-DO NOT go       Back to bed or wait to see if you get better-TIME IS BRAIN. Date: 6/25/2018  Discharging Nurse:  Marva Acevedo RN

## 2018-06-25 NOTE — PROGRESS NOTES
Discharge complete. Discharge instructions reviewed with pt. Time for questions allowed. Pt denies any questions at this time. Dressing to abd noted to be clean, dry, and intact. Pt assisted to front of hospital via ambulatory with cane.      Visit Vitals    /58    Pulse 86    Temp 97.7 °F (36.5 °C)    Resp 16    Ht 6' (1.829 m)    Wt 65.8 kg (145 lb)    SpO2 96%    BMI 19.67 kg/m2

## 2018-06-25 NOTE — PROGRESS NOTES
.                                                 Interventional Radiology Post Paracentesis  6/25/2018    Procedure(s): Ultrasound guided Therapeutic Paracentesis Performed with 8 Faroese catheter total volume 6000 ml. Samples sent to lab. Preliminary Findings: moderate cloudy. Complications: None    Plan:  Observation, check labs if drawn.           Chest X-Ray:  no    Full dictated report to follow    Signed By: Nanette Marcelo RN

## 2018-06-26 PROCEDURE — 0651 HSPC ROUTINE HOME CARE

## 2018-06-27 PROCEDURE — 0651 HSPC ROUTINE HOME CARE

## 2018-06-28 ENCOUNTER — HOME CARE VISIT (OUTPATIENT)
Dept: SCHEDULING | Facility: HOME HEALTH | Age: 58
End: 2018-06-28
Payer: MEDICARE

## 2018-06-28 VITALS — SYSTOLIC BLOOD PRESSURE: 76 MMHG | HEART RATE: 100 BPM | RESPIRATION RATE: 22 BRPM | DIASTOLIC BLOOD PRESSURE: 44 MMHG

## 2018-06-28 PROCEDURE — 0651 HSPC ROUTINE HOME CARE

## 2018-06-28 PROCEDURE — G0299 HHS/HOSPICE OF RN EA 15 MIN: HCPCS

## 2018-07-01 ENCOUNTER — HOSPICE ADMISSION (OUTPATIENT)
Dept: HOSPICE | Facility: HOSPICE | Age: 58
End: 2018-07-01